# Patient Record
(demographics unavailable — no encounter records)

---

## 2024-11-04 NOTE — DISCUSSION/SUMMARY
[Normal Growth] : growth [Normal Development] : development [None] : No known medical problems [No Elimination Concerns] : elimination [No Feeding Concerns] : feeding [No Skin Concerns] : skin [Normal Sleep Pattern] : sleep [Family Adaptation] : family adaptation [Infant [place]] : infant independence [Feeding Routine] : feeding routine [Safety] : safety [No Medications] : ~He/She~ is not on any medications [Parent/Guardian] : parent/guardian [] : The components of the vaccine(s) to be administered today are listed in the plan of care. The disease(s) for which the vaccine(s) are intended to prevent and the risks have been discussed with the caretaker.  The risks are also included in the appropriate vaccination information statements which have been provided to the patient's caregiver.  The caregiver has given consent to vaccinate. [FreeTextEntry1] : 9 month old F presenting for HCM. Growth and development normal. PE unremarkable, except for some clear rhinorrhea and nasal congestion. Large, reducible umbilical hernia. URI symptoms likely viral etiology, advised continued supportive care.  - Routine care & anticipatory guidance given - Vaccines today: Pediarix, Prevnar, Hib - Postvaccine care discussed, Tylenol/Motrin PRN for pain or fever - Continue ad sultana feeds and intro to solids, may advance to finger foods - Choking hazards reviewed, no cows milk or honey until after age 1 year old - Surgery referral for large umbilical hernia, discussed warning signs of incarceration which warrants ED visit - RTC for 12 month old HCM and prn  Caretaker expressed understanding of the plan and agrees. All questions were answered.

## 2024-11-04 NOTE — DISCUSSION/SUMMARY
[Normal Growth] : growth [Normal Development] : development [None] : No known medical problems [No Elimination Concerns] : elimination [No Feeding Concerns] : feeding [No Skin Concerns] : skin [Normal Sleep Pattern] : sleep [Family Adaptation] : family adaptation [Infant [place]] : infant independence [Feeding Routine] : feeding routine [Safety] : safety [No Medications] : ~He/She~ is not on any medications [Parent/Guardian] : parent/guardian [] : The components of the vaccine(s) to be administered today are listed in the plan of care. The disease(s) for which the vaccine(s) are intended to prevent and the risks have been discussed with the caretaker.  The risks are also included in the appropriate vaccination information statements which have been provided to the patient's caregiver.  The caregiver has given consent to vaccinate. [FreeTextEntry1] : 9 month old F presenting for HCM. Growth and development normal. PE unremarkable, except for some clear rhinorrhea and nasal congestion. Large, reducible umbilical hernia. URI symptoms likely viral etiology, advised continued supportive care.  - Routine care & anticipatory guidance given - Vaccines today: Pediarix, Prevnar, Hib - Postvaccine care discussed, Tylenol/Motrin PRN for pain or fever - Continue ad sultana feeds and intro to solids, may advance to finger foods - Choking hazards reviewed, no cows milk or honey until after age 1 year old - Surgery referral for large umbilical hernia, discussed warning signs of incarceration which warrants ED visit - RTC for 12 month old HCM and prn  Caretaker expressed understanding of the plan and agrees. All questions were answered.

## 2024-11-04 NOTE — REVIEW OF SYSTEMS
[Cough] : cough [Negative] : Genitourinary [Nasal Discharge] : nasal discharge [Nasal Congestion] : nasal congestion [FreeTextEntry1] : Cough 1 weeks, no rhinorrhea, some nasal congestion, no sick contacts, no

## 2024-11-04 NOTE — PHYSICAL EXAM
[Alert] : alert [Normocephalic] : normocephalic [Flat Open Anterior [place]] : flat open anterior fontanelle [Red Reflex] : red reflex bilateral [PERRL] : PERRL [Normally Placed Ears] : normally placed ears [Auricles Well Formed] : auricles well formed [Clear Tympanic membranes] : clear tympanic membranes [Light reflex present] : light reflex present [Bony landmarks visible] : bony landmarks visible [Nares Patent] : nares patent [Palate Intact] : palate intact [Uvula Midline] : uvula midline [Supple, full passive range of motion] : supple, full passive range of motion [Symmetric Chest Rise] : symmetric chest rise [Clear to Auscultation Bilaterally] : clear to auscultation bilaterally [Regular Rate and Rhythm] : regular rate and rhythm [S1, S2 present] : S1, S2 present [+2 Femoral Pulses] : (+) 2 femoral pulses [Soft] : soft [Bowel Sounds] : bowel sounds present [Normal External Genitalia] : normal external genitalia [Normal Vaginal Introitus] : normal vaginal introitus [No Abnormal Lymph Nodes Palpated] : no abnormal lymph nodes palpated [Symmetric abduction and rotation of hips] : symmetric abduction and rotation of hips [Straight] : straight [Cranial Nerves Grossly Intact] : cranial nerves grossly intact [Acute Distress] : no acute distress [Excessive Tearing] : no excessive tearing [Discharge] : discharge [Palpable Masses] : no palpable masses [Murmurs] : no murmurs [Tender] : nontender [Distended] : nondistended [Hepatomegaly] : no hepatomegaly [Splenomegaly] : no splenomegaly [Clitoromegaly] : no clitoromegaly [Allis Sign] : negative Allis sign [Rash or Lesions] : no rash/lesions [de-identified] : Clear rhinorrhea and congestion [de-identified] : Large, soft reducible umbilical hernia

## 2024-11-04 NOTE — HISTORY OF PRESENT ILLNESS
[Breast milk] : breast milk [Formula ___ oz/feed] : [unfilled] oz of formula per feed [Hours between feeds ___] : Child is fed every [unfilled] hours [Fruit] : fruit [Vegetables] : vegetables [Cereal] : cereal [Eggs] : eggs [Baby food] : baby food [Water] : water [Normal] : Normal [___ voids per day] : [unfilled] voids per day [Frequency of stools: ___] : Frequency of stools: [unfilled]  stools [Sleeps 12-16 hours per 24 hours (including naps)] : sleeps 12-16 hours per 24 hours (including naps) [Bottle in bed] : bottle in bed [Brushing teeth] : brushing teeth [Water heater temperature set at <120 degrees F] : Water heater temperature set at <120 degrees F [Rear facing car seat in  back seat] : Rear facing car seat in  back seat [Carbon Monoxide Detectors] : Carbon monoxide detectors [Smoke Detectors] : Smoke detectors [Sippy Cup use] : not using sippy cup [FreeTextEntry7] : 9mo F presenting for WCC. She has some cough and rhinorrhea past 1 week. No fevers, PO intake and UOP at baseline.

## 2024-11-04 NOTE — PHYSICAL EXAM
[Alert] : alert [Normocephalic] : normocephalic [Flat Open Anterior [place]] : flat open anterior fontanelle [Red Reflex] : red reflex bilateral [PERRL] : PERRL [Normally Placed Ears] : normally placed ears [Auricles Well Formed] : auricles well formed [Clear Tympanic membranes] : clear tympanic membranes [Light reflex present] : light reflex present [Bony landmarks visible] : bony landmarks visible [Nares Patent] : nares patent [Palate Intact] : palate intact [Uvula Midline] : uvula midline [Supple, full passive range of motion] : supple, full passive range of motion [Symmetric Chest Rise] : symmetric chest rise [Clear to Auscultation Bilaterally] : clear to auscultation bilaterally [Regular Rate and Rhythm] : regular rate and rhythm [S1, S2 present] : S1, S2 present [+2 Femoral Pulses] : (+) 2 femoral pulses [Soft] : soft [Bowel Sounds] : bowel sounds present [Normal External Genitalia] : normal external genitalia [Normal Vaginal Introitus] : normal vaginal introitus [No Abnormal Lymph Nodes Palpated] : no abnormal lymph nodes palpated [Symmetric abduction and rotation of hips] : symmetric abduction and rotation of hips [Straight] : straight [Cranial Nerves Grossly Intact] : cranial nerves grossly intact [Acute Distress] : no acute distress [Excessive Tearing] : no excessive tearing [Discharge] : discharge [Palpable Masses] : no palpable masses [Murmurs] : no murmurs [Tender] : nontender [Distended] : nondistended [Hepatomegaly] : no hepatomegaly [Splenomegaly] : no splenomegaly [Clitoromegaly] : no clitoromegaly [Allis Sign] : negative Allis sign [Rash or Lesions] : no rash/lesions [de-identified] : Clear rhinorrhea and congestion [de-identified] : Large, soft reducible umbilical hernia

## 2024-11-04 NOTE — DEVELOPMENTAL MILESTONES
[Uses basic gestures] : uses basic gestures [Says "Gerald" or "Mama"] : says "Gerald" or "Mama" nonspecifically [Sits well without support] : sits well without support [Transitions between sitting and lying] : transitions between sitting and lying [Crawls] : crawls [Picks up small objects with 3 fingers] : picks up small objects with 3 fingers and thumb [[place] objects together] : bangs objects together [Balances on hands and knees] : balances on hands and knees

## 2024-11-04 NOTE — DEVELOPMENTAL MILESTONES
[Uses basic gestures] : uses basic gestures [Says "Gerald" or "Mama"] : says "Gerald" or "Mama" nonspecifically [Sits well without support] : sits well without support [Transitions between sitting and lying] : transitions between sitting and lying [Crawls] : crawls [Picks up small objects with 3 fingers] : picks up small objects with 3 fingers and thumb [[place] objects together] : bangs objects together [Balances on hands and knees] : balances on hands and knees

## 2025-03-24 NOTE — HISTORY OF PRESENT ILLNESS
[Mother] : mother [Hours between feeds ___] : Child is fed every [unfilled] hours [___ Feeding per 24 hrs] : a  total of [unfilled] feedings in 24 hours [Formula ___ oz/feed] : [unfilled] oz of formula per feed [Fruit] : fruit [Vegetables] : vegetables [Meat] : meat [Dairy] : dairy [Baby food] : baby food [Finger food] : finger food [Table food] : table food [Vitamin ___] : Patient takes [unfilled] vitamin daily [___ stools per day] : [unfilled]  stools per day [___ voids per day] : [unfilled] voids per day [On back] : On back [In crib] : In crib [Pacifier use] : Pacifier use [Sippy cup use] : Sippy cup use [Brushing teeth] : Brushing teeth [Toothpaste] : Primary Fluoride Source: Toothpaste [Playtime] : Playtime  [No] : Not at  exposure [Water heater temperature set at <120 degrees F] : Water heater temperature set at <120 degrees F [Car seat in back seat] : Car seat in back seat [Smoke Detectors] : Smoke detectors [Carbon Monoxide Detectors] : Carbon monoxide detectors [Up to date] : Up to date [Exposure to electronic nicotine delivery system] : No exposure to electronic nicotine delivery system [At risk for exposure to TB] : Not at risk for exposure to Tuberculosis [de-identified] : Formula 3x/day  [FreeTextEntry8] : black/brown [FreeTextEntry1] : Patient was unable to be seen by a surgeon for umbilical hernia, persists same, no other concerns right now.    and bottle fed infant (V49.89) (Z78.9) Dry skin (701.1) (L85.3) Follow-up exam (V67.9) (Z09) Gassiness (787.3) (R14.0)  screening tests negative (V82.9) (Z13.9) Resides in shelter (V60.0) (Z59.01) Umbilical hernia (553.1) (K42.9) WCC (well child check) (V20.2) (Z00.129)

## 2025-03-24 NOTE — DISCUSSION/SUMMARY
[Normal Growth] : growth [Normal Development] : development [None] : No known medical problems [No Elimination Concerns] : elimination [No Feeding Concerns] : feeding [No Skin Concerns] : skin [Normal Sleep Pattern] : sleep [No Medications] : ~He/She~ is not on any medications [Parent/Guardian] : parent/guardian [Family Support] : family support [Establishing Routines] : establishing routines [Feeding and Appetite Changes] : feeding and appetite changes [Establishing A Dental Home] : establishing a dental home [Safety] : safety [] : The components of the vaccine(s) to be administered today are listed in the plan of care. The disease(s) for which the vaccine(s) are intended to prevent and the risks have been discussed with the caretaker.  The risks are also included in the appropriate vaccination information statements which have been provided to the patient's caregiver.  The caregiver has given consent to vaccinate. [FreeTextEntry1] : 13 month old F presenting for HCM. Growth and development normal. PE remarkable for persistent umbilical hernia, soft and reducible. Advised continued observation and warning signs of strangulation reviewed, to seek immediate medical attention. Immunizations UTD. She is not on any medications.  Information discussed with father.  The components of the vaccine(s) to be administered today are listed in the plan of care. The disease(s) for which the vaccine(s) are intended to prevent and the risks have been discussed with the caretaker. The risks are also included in the appropriate vaccination information statements which have been provided to the patient's caregiver. The caregiver has given consent to vaccinate.  Father declined Hepatitis A vaccine.  - Routine care & anticipatory guidance given - CBC & lead to be done - Vaccines given: MMR, Varicella  - Post vaccine care discussed & potential side effects reviewed - Tylenol every 4 hours prn or Motrin every 6 hours prn for pain or fever - Counseled on introduction of cow's milk & possibility of temporary constipation during transitioning, may use prune juice for relief - Choking hazards reviewed - Referred to dental for routine screen, may brush teeth with toothbrush and smear of fluoridated toothpaste - RTC for 15 month old HCM and prn  Caretaker expressed understanding of the plan and agrees. All questions were answered.

## 2025-03-24 NOTE — PHYSICAL EXAM
[Alert] : alert [Normocephalic] : normocephalic [Closed Anterior [place]] : closed anterior fontanelle [Red Reflex] : red reflex bilateral [PERRL] : PERRL [Normally Placed Ears] : normally placed ears [Auricles Well Formed] : auricles well formed [Clear Tympanic membranes] : clear tympanic membranes [Light reflex present] : light reflex present [Bony landmarks visible] : bony landmarks visible [Discharge] : no discharge [Nares Patent] : nares patent [Palate Intact] : palate intact [Uvula Midline] : uvula midline [Tooth Eruption] : tooth eruption [Supple, full passive range of motion] : supple, full passive range of motion [Palpable Masses] : no palpable masses [Symmetric Chest Rise] : symmetric chest rise [Clear to Auscultation Bilaterally] : clear to auscultation bilaterally [Regular Rate and Rhythm] : regular rate and rhythm [S1, S2 present] : S1, S2 present [Murmurs] : no murmurs [+2 Femoral Pulses] : (+) 2 femoral pulses [Soft] : soft [Tender] : nontender [Distended] : nondistended [Bowel Sounds] : normoactive bowel sounds [Hepatomegaly] : no hepatomegaly [Splenomegaly] : no splenomegaly [Normal External Genitalia] : normal external genitalia [Clitoromegaly] : no clitoromegaly [Normal Vaginal Introitus] : normal vaginal introitus [No Abnormal Lymph Nodes Palpated] : no abnormal lymph nodes palpated [Symmetric Abduction and Rotation of Hips] : symmetric abduction and rotation of hips [Allis Sign] : negative Allis sign [Straight] : straight [Cranial Nerves Grossly Intact] : cranial nerves grossly intact [Rash or Lesions] : no rash/lesions [de-identified] : +umbilical hernia (soft/reducible)

## 2025-07-08 NOTE — DEVELOPMENTAL MILESTONES
[Yes: _______] : yes, [unfilled] [Imitates scribbling] : imitates scribbling [Drinks from cup with little] : drinks from cup with little spilling [Points to ask for something] : points to ask for something or to get help [Follows directions that do not] : follows direction that do not include a gesture [Looks when parent says,] : looks when parent says, "Where is...?" [Squats to  objects] : squats to  objects [Crawls up a few steps] : crawls up a few steps [Begins to run] : begins to run [Makes mode with crayon] : makes mode with clintyon [Drops object into and takes object] : drops object into and takes object out of container [Uses 3 words other than names] : does not use 3 words other than names [Speaks in sounds that seem like] : does not speak in sounds that seem like an unknown language

## 2025-07-08 NOTE — DISCUSSION/SUMMARY
[Normal Growth] : growth [Normal Development] : development [No Elimination Concerns] : elimination [No Feeding Concerns] : feeding [No Skin Concerns] : skin [Sleep Routines and Issues] : sleep routines and issues [Healthy Teeth] : healthy teeth [Safety] : safety [Mother] : mother [FreeTextEntry1] :  17-month-old F presenting for HCM. Growth and development normal Head circumference crossing two percentiles. PE remarkable for papular rash over back. Likely heat rash as this started when the temperature started increasing. Immunizations UTD.  - Routine care & anticipatory guidance given - CBC & lead  - Vaccines given: DTaP, IPV, Hep A #1 and Prevnar - Post vaccine care discussed & potential side effects reviewed - Tylenol every 4 hours prn or Motrin every 6 hours prn for pain or fever- Rx given for tylenol  - Grain sized amount of fluorinated toothpaste  - Choking hazards reviewed - Follow up with dental as planned- referral made  - RTC for 18-month-old HCM and prn  Heat rash - Use gentle unscented soaps to bathe - Unscented lotion after bathing  Caretaker expressed understanding of the plan and agrees. All questions were answered.   DELAYED SPEECH - EI referral  - Discussed reading picture books with her- books given  - F/u 3 months

## 2025-07-08 NOTE — PHYSICAL EXAM
[Alert] : alert [No Acute Distress] : no acute distress [Normocephalic] : normocephalic [Anterior [place] Closed] : anterior fontanelle closed [Normally Placed Ears] : normally placed ears [Auricles Well Formed] : auricles well formed [Clear Tympanic membranes with present light reflex and bony landmarks] : clear tympanic membranes with present light reflex and bony landmarks [No Discharge] : no discharge [Nares Patent] : nares patent [Palate Intact] : palate intact [Uvula Midline] : uvula midline [Tooth Eruption] : tooth eruption  [Supple, full passive range of motion] : supple, full passive range of motion [No Palpable Masses] : no palpable masses [Symmetric Chest Rise] : symmetric chest rise [Clear to Auscultation Bilaterally] : clear to auscultation bilaterally [Regular Rate and Rhythm] : regular rate and rhythm [S1, S2 present] : S1, S2 present [No Murmurs] : no murmurs [+2 Femoral Pulses] : +2 femoral pulses [Soft] : soft [NonTender] : non tender [Non Distended] : non distended [Normoactive Bowel Sounds] : normoactive bowel sounds [No Hepatomegaly] : no hepatomegaly [No Splenomegaly] : no splenomegaly [Sunny 1] : Sunny 1 [No Clitoromegaly] : no clitoromegaly [Normal Vaginal Introitus] : normal vaginal introitus [Patent] : patent [Normally Placed] : normally placed [No Abnormal Lymph Nodes Palpated] : no abnormal lymph nodes palpated [No Clavicular Crepitus] : no clavicular crepitus [Symmetric Buttocks Creases] : symmetric buttocks creases [No Spinal Dimple] : no spinal dimple [NoTuft of Hair] : no tuft of hair [Cranial Nerves Grossly Intact] : cranial nerves grossly intact [FreeTextEntry9] : reducible umbilical hernia  [FreeTextEntry6] : no vaginal discharge  [de-identified] : mild papular rash over back

## 2025-07-08 NOTE — HISTORY OF PRESENT ILLNESS
[Mother] : mother [Formula (Ounces per day ___)] : consumes [unfilled] oz of formula per day [Fruit] : fruit [Meat] : meat [Cereal] : cereal [Eggs] : eggs [Baby food] : baby food [Finger Foods] : finger foods [Table food] : table food [___ stools per day] : [unfilled]  stools per day [Normal] : Normal [In crib] : In crib [Wakes up at night] : Wakes up at night [Pacifier use] : Pacifier use [Sippy cup use] : Sippy cup use [Brushing teeth] : Brushing teeth [Car seat in back seat] : Car seat in back seat [Carbon Monoxide Detectors] : Carbon monoxide detectors [Smoke Detectors] : Smoke detectors [NO] : No [Vegetables] : vegetables [Firm] : firm consistency [___ voids per day] : [unfilled] voids per day [None] : Primary Fluoride Source: None [No] : Not at  exposure [Up to date] : Up to date [Exposure to electronic nicotine delivery system] : No exposure to electronic nicotine delivery system [de-identified] : Not saying words  [de-identified] : 5 bottles of formula per day  [FreeTextEntry8] : solid stools green  [FreeTextEntry3] : Difficulty sleeping at night  [de-identified] : brushes twicer per day  [de-identified] : unsure of water heater temperature. Will check when she gets home  [FreeTextEntry1] : 17m F with h/o umbilical hernia presenting for 15m [facility] visit. Voiding and stooling well. Eating a variety of foods. However, she is drinking 8 bottles of milk per day. Has not seen the dentist yet, but mother is brushing two times per day. Mother notes some white vaginal discharge.   SDOH Screening Questionnaire   SDOH (Social Determinants of Health) Questionnaire: 1. Housing: Do you worry that in the upcoming months, your family, or child, may not have a safe or stable place to live? no 2. Food security: Within the last 12 months, did the food you bought not last and you did not have money to buy more? no 3. Community: Do you need help getting public benefits like food stamps or WIC? no 4. Transportation: Does your child have chronic medical condition and therefore struggle with transportation to attend medical appointments? yes 5. Healthcare Access: Do you need help getting health or dental insurance? no       Result: Negative Screen. No further intervention needed.